# Patient Record
Sex: FEMALE | Race: WHITE | NOT HISPANIC OR LATINO | Employment: UNEMPLOYED | URBAN - METROPOLITAN AREA
[De-identification: names, ages, dates, MRNs, and addresses within clinical notes are randomized per-mention and may not be internally consistent; named-entity substitution may affect disease eponyms.]

---

## 2022-03-05 ENCOUNTER — HOSPITAL ENCOUNTER (EMERGENCY)
Facility: HOSPITAL | Age: 32
Discharge: HOME | End: 2022-03-05
Attending: EMERGENCY MEDICINE

## 2022-03-05 VITALS
HEART RATE: 103 BPM | BODY MASS INDEX: 21.26 KG/M2 | DIASTOLIC BLOOD PRESSURE: 80 MMHG | OXYGEN SATURATION: 98 % | HEIGHT: 63 IN | SYSTOLIC BLOOD PRESSURE: 132 MMHG | WEIGHT: 120 LBS | RESPIRATION RATE: 18 BRPM

## 2022-03-05 DIAGNOSIS — R10.13 EPIGASTRIC ABDOMINAL PAIN: Primary | ICD-10-CM

## 2022-03-05 PROCEDURE — 99284 EMERGENCY DEPT VISIT MOD MDM: CPT | Mod: ,,, | Performed by: EMERGENCY MEDICINE

## 2022-03-05 PROCEDURE — 99284 PR EMERGENCY DEPT VISIT,LEVEL IV: ICD-10-PCS | Mod: ,,, | Performed by: EMERGENCY MEDICINE

## 2022-03-05 PROCEDURE — 25000003 PHARM REV CODE 250: Performed by: EMERGENCY MEDICINE

## 2022-03-05 PROCEDURE — 99284 EMERGENCY DEPT VISIT MOD MDM: CPT

## 2022-03-05 RX ORDER — BUPROPION HYDROCHLORIDE 200 MG/1
100 TABLET, EXTENDED RELEASE ORAL 2 TIMES DAILY
COMMUNITY

## 2022-03-05 RX ORDER — SUCRALFATE 1 G/1
1 TABLET ORAL 4 TIMES DAILY
Qty: 40 TABLET | Refills: 0 | Status: SHIPPED | OUTPATIENT
Start: 2022-03-05 | End: 2022-03-15

## 2022-03-05 RX ORDER — ALUMINUM HYDROXIDE, MAGNESIUM HYDROXIDE, AND SIMETHICONE 2400; 240; 2400 MG/30ML; MG/30ML; MG/30ML
30 SUSPENSION ORAL ONCE
Status: DISCONTINUED | OUTPATIENT
Start: 2022-03-05 | End: 2022-03-05

## 2022-03-05 RX ORDER — HYDROCHLOROTHIAZIDE 25 MG/1
25 TABLET ORAL DAILY
COMMUNITY

## 2022-03-05 RX ORDER — MAG HYDROX/ALUMINUM HYD/SIMETH 200-200-20
30 SUSPENSION, ORAL (FINAL DOSE FORM) ORAL
Status: COMPLETED | OUTPATIENT
Start: 2022-03-05 | End: 2022-03-05

## 2022-03-05 RX ORDER — ALUMINUM HYDROXIDE, MAGNESIUM HYDROXIDE, AND SIMETHICONE 2400; 240; 2400 MG/30ML; MG/30ML; MG/30ML
30 SUSPENSION ORAL EVERY 6 HOURS PRN
Status: DISCONTINUED | OUTPATIENT
Start: 2022-03-05 | End: 2022-03-05

## 2022-03-05 RX ORDER — OMEPRAZOLE 20 MG/1
20 CAPSULE, DELAYED RELEASE ORAL DAILY
Qty: 30 CAPSULE | Refills: 0 | Status: SHIPPED | OUTPATIENT
Start: 2022-03-05

## 2022-03-05 RX ORDER — LIDOCAINE HYDROCHLORIDE 20 MG/ML
10 SOLUTION OROPHARYNGEAL
Status: COMPLETED | OUTPATIENT
Start: 2022-03-05 | End: 2022-03-05

## 2022-03-05 RX ADMIN — LIDOCAINE HYDROCHLORIDE 10 ML: 20 SOLUTION ORAL; TOPICAL at 02:03

## 2022-03-05 RX ADMIN — ALUMINUM HYDROXIDE, MAGNESIUM HYDROXIDE, AND SIMETHICONE 30 ML: 200; 200; 20 SUSPENSION ORAL at 02:03

## 2022-03-05 NOTE — ED TRIAGE NOTES
Wesley Romo, a 32 y.o. female presents to the ED w/ complaint of upper abdominal pain since yesterday. Pt reports sharp, intermittent pain to abdomen. Denies n/v/d/. Denies chest pain/SOB.     Triage note:  Chief Complaint   Patient presents with    Abdominal Pain     Epigastric  pain x 3 days     Review of patient's allergies indicates:  Not on File  No past medical history on file.    Patient identifiers verified and correct for Wesley Romo    LOC: The patient is awake, alert, and aware of environment. The patient is AOX4 and speaking appropriately.   APPEARANCE: No acute distress noted.   HEENT: WDL, PERRLA  PSYCHOSOCIAL: Patient is calm and cooperative. Denies SI/HI.  SKIN: The skin is warm, dry, color consistent with ethnicity. No breakdown or brusing visible.  RESPIRATORY: Airway is open and patent. Bilateral chest rise and fall. Respiratory rate even and unlabored.  No accessory muscle use noted.  CARDIAC: Patient has a normal rate and rhythm. No complaints of chest pain.  ABDOMEN/GI: Soft, non tender. No distention noted. Denies n/v/d. Reports sharp, intermittent abdominal pain since yesterday.   URINARY:  Voids independently without difficulty. No complaints of frequency, urgency, burning, or blood in urine.   NEUROLOGIC: Eyes open spontaneously. Speech clear.  Able to follow commands, demonstrating ability to actively and appropriately communicate within context of current conversation. Symmetrical facial muscles. Movement is purposeful. Denies dizziness/lightheadedness.  MUSCULOSKELETAL: No obvious deformities noted. Full ROM in all extremities.  PERIPHERAL VASCULAR: Cap refill <3 secs bilaterally. No peripheral edema noted. Denies numbness and tingling in extremities.

## 2022-10-20 ENCOUNTER — HOSPITAL ENCOUNTER (EMERGENCY)
Facility: HOSPITAL | Age: 32
Discharge: HOME | End: 2022-10-21
Attending: EMERGENCY MEDICINE

## 2022-10-20 DIAGNOSIS — R05.9 COUGH: ICD-10-CM

## 2022-10-20 DIAGNOSIS — J10.1 INFLUENZA A: ICD-10-CM

## 2022-10-20 DIAGNOSIS — J45.901 EXACERBATION OF ASTHMA, UNSPECIFIED ASTHMA SEVERITY, UNSPECIFIED WHETHER PERSISTENT: Primary | ICD-10-CM

## 2022-10-20 LAB
ALBUMIN SERPL BCP-MCNC: 3.7 G/DL (ref 3.5–5.2)
ALP SERPL-CCNC: 71 U/L (ref 55–135)
ALT SERPL W/O P-5'-P-CCNC: 15 U/L (ref 10–44)
ANION GAP SERPL CALC-SCNC: 10 MMOL/L (ref 8–16)
AST SERPL-CCNC: 14 U/L (ref 10–40)
BASOPHILS # BLD AUTO: 0.03 K/UL (ref 0–0.2)
BASOPHILS NFR BLD: 0.4 % (ref 0–1.9)
BILIRUB SERPL-MCNC: 0.6 MG/DL (ref 0.1–1)
BUN SERPL-MCNC: 4 MG/DL (ref 6–20)
CALCIUM SERPL-MCNC: 9 MG/DL (ref 8.7–10.5)
CHLORIDE SERPL-SCNC: 102 MMOL/L (ref 95–110)
CO2 SERPL-SCNC: 20 MMOL/L (ref 23–29)
CREAT SERPL-MCNC: 0.6 MG/DL (ref 0.5–1.4)
DIFFERENTIAL METHOD: ABNORMAL
EOSINOPHIL # BLD AUTO: 0 K/UL (ref 0–0.5)
EOSINOPHIL NFR BLD: 0.4 % (ref 0–8)
ERYTHROCYTE [DISTWIDTH] IN BLOOD BY AUTOMATED COUNT: 13.2 % (ref 11.5–14.5)
EST. GFR  (NO RACE VARIABLE): >60 ML/MIN/1.73 M^2
GLUCOSE SERPL-MCNC: 92 MG/DL (ref 70–110)
HCT VFR BLD AUTO: 38.7 % (ref 37–48.5)
HCV AB SERPL QL IA: NORMAL
HGB BLD-MCNC: 12.8 G/DL (ref 12–16)
HIV 1+2 AB+HIV1 P24 AG SERPL QL IA: NORMAL
IMM GRANULOCYTES # BLD AUTO: 0.06 K/UL (ref 0–0.04)
IMM GRANULOCYTES NFR BLD AUTO: 0.8 % (ref 0–0.5)
INFLUENZA A, MOLECULAR: POSITIVE
INFLUENZA B, MOLECULAR: NEGATIVE
LYMPHOCYTES # BLD AUTO: 0.5 K/UL (ref 1–4.8)
LYMPHOCYTES NFR BLD: 5.7 % (ref 18–48)
MCH RBC QN AUTO: 29.6 PG (ref 27–31)
MCHC RBC AUTO-ENTMCNC: 33.1 G/DL (ref 32–36)
MCV RBC AUTO: 90 FL (ref 82–98)
MONOCYTES # BLD AUTO: 0.5 K/UL (ref 0.3–1)
MONOCYTES NFR BLD: 6.2 % (ref 4–15)
NEUTROPHILS # BLD AUTO: 6.8 K/UL (ref 1.8–7.7)
NEUTROPHILS NFR BLD: 86.5 % (ref 38–73)
NRBC BLD-RTO: 0 /100 WBC
PLATELET # BLD AUTO: 354 K/UL (ref 150–450)
PMV BLD AUTO: 9.6 FL (ref 9.2–12.9)
POTASSIUM SERPL-SCNC: 3.2 MMOL/L (ref 3.5–5.1)
PROT SERPL-MCNC: 7.1 G/DL (ref 6–8.4)
RBC # BLD AUTO: 4.32 M/UL (ref 4–5.4)
SARS-COV-2 RDRP RESP QL NAA+PROBE: NEGATIVE
SODIUM SERPL-SCNC: 132 MMOL/L (ref 136–145)
SPECIMEN SOURCE: ABNORMAL
WBC # BLD AUTO: 7.88 K/UL (ref 3.9–12.7)

## 2022-10-20 PROCEDURE — 25000242 PHARM REV CODE 250 ALT 637 W/ HCPCS: Performed by: PHYSICIAN ASSISTANT

## 2022-10-20 PROCEDURE — 63600175 PHARM REV CODE 636 W HCPCS: Performed by: PHYSICIAN ASSISTANT

## 2022-10-20 PROCEDURE — 99284 EMERGENCY DEPT VISIT MOD MDM: CPT | Mod: CS,,, | Performed by: EMERGENCY MEDICINE

## 2022-10-20 PROCEDURE — 94640 AIRWAY INHALATION TREATMENT: CPT | Mod: XB

## 2022-10-20 PROCEDURE — 25000003 PHARM REV CODE 250: Performed by: PHYSICIAN ASSISTANT

## 2022-10-20 PROCEDURE — 85025 COMPLETE CBC W/AUTO DIFF WBC: CPT | Performed by: PHYSICIAN ASSISTANT

## 2022-10-20 PROCEDURE — 87502 INFLUENZA DNA AMP PROBE: CPT | Performed by: PHYSICIAN ASSISTANT

## 2022-10-20 PROCEDURE — 87389 HIV-1 AG W/HIV-1&-2 AB AG IA: CPT | Performed by: PHYSICIAN ASSISTANT

## 2022-10-20 PROCEDURE — 93005 ELECTROCARDIOGRAM TRACING: CPT

## 2022-10-20 PROCEDURE — 94761 N-INVAS EAR/PLS OXIMETRY MLT: CPT

## 2022-10-20 PROCEDURE — 99284 PR EMERGENCY DEPT VISIT,LEVEL IV: ICD-10-PCS | Mod: CS,,, | Performed by: EMERGENCY MEDICINE

## 2022-10-20 PROCEDURE — 96374 THER/PROPH/DIAG INJ IV PUSH: CPT

## 2022-10-20 PROCEDURE — 93010 ELECTROCARDIOGRAM REPORT: CPT | Mod: ,,, | Performed by: INTERNAL MEDICINE

## 2022-10-20 PROCEDURE — 86803 HEPATITIS C AB TEST: CPT | Performed by: PHYSICIAN ASSISTANT

## 2022-10-20 PROCEDURE — 96361 HYDRATE IV INFUSION ADD-ON: CPT | Mod: 59

## 2022-10-20 PROCEDURE — 80053 COMPREHEN METABOLIC PANEL: CPT | Performed by: PHYSICIAN ASSISTANT

## 2022-10-20 PROCEDURE — U0002 COVID-19 LAB TEST NON-CDC: HCPCS | Performed by: PHYSICIAN ASSISTANT

## 2022-10-20 PROCEDURE — 99285 EMERGENCY DEPT VISIT HI MDM: CPT | Mod: 25

## 2022-10-20 PROCEDURE — 93010 EKG 12-LEAD: ICD-10-PCS | Mod: ,,, | Performed by: INTERNAL MEDICINE

## 2022-10-20 RX ORDER — OSELTAMIVIR PHOSPHATE 75 MG/1
75 CAPSULE ORAL
Status: COMPLETED | OUTPATIENT
Start: 2022-10-20 | End: 2022-10-20

## 2022-10-20 RX ORDER — IPRATROPIUM BROMIDE AND ALBUTEROL SULFATE 2.5; .5 MG/3ML; MG/3ML
3 SOLUTION RESPIRATORY (INHALATION)
Status: COMPLETED | OUTPATIENT
Start: 2022-10-20 | End: 2022-10-20

## 2022-10-20 RX ORDER — DEXAMETHASONE SODIUM PHOSPHATE 4 MG/ML
10 INJECTION, SOLUTION INTRA-ARTICULAR; INTRALESIONAL; INTRAMUSCULAR; INTRAVENOUS; SOFT TISSUE
Status: COMPLETED | OUTPATIENT
Start: 2022-10-20 | End: 2022-10-20

## 2022-10-20 RX ORDER — ACETAMINOPHEN 500 MG
1000 TABLET ORAL
Status: COMPLETED | OUTPATIENT
Start: 2022-10-20 | End: 2022-10-20

## 2022-10-20 RX ORDER — BENZONATATE 100 MG/1
100 CAPSULE ORAL
Status: COMPLETED | OUTPATIENT
Start: 2022-10-20 | End: 2022-10-20

## 2022-10-20 RX ADMIN — BENZONATATE 100 MG: 100 CAPSULE ORAL at 08:10

## 2022-10-20 RX ADMIN — ACETAMINOPHEN 1000 MG: 500 TABLET ORAL at 08:10

## 2022-10-20 RX ADMIN — DEXAMETHASONE SODIUM PHOSPHATE 10 MG: 4 INJECTION INTRA-ARTICULAR; INTRALESIONAL; INTRAMUSCULAR; INTRAVENOUS; SOFT TISSUE at 09:10

## 2022-10-20 RX ADMIN — IPRATROPIUM BROMIDE AND ALBUTEROL SULFATE 3 ML: 2.5; .5 SOLUTION RESPIRATORY (INHALATION) at 08:10

## 2022-10-20 RX ADMIN — OSELTAMIVIR 75 MG: 75 CAPSULE ORAL at 11:10

## 2022-10-20 RX ADMIN — SODIUM CHLORIDE 500 ML: 0.9 INJECTION, SOLUTION INTRAVENOUS at 08:10

## 2022-10-20 NOTE — Clinical Note
"Wesley Romo (Toni) was seen and treated in our emergency department on 10/20/2022.  She may return to work on 10/25/2022.       If you have any questions or concerns, please don't hesitate to call.      Dionte Villasenor PA-C"

## 2022-10-21 VITALS
BODY MASS INDEX: 40.74 KG/M2 | TEMPERATURE: 99 F | SYSTOLIC BLOOD PRESSURE: 124 MMHG | RESPIRATION RATE: 18 BRPM | DIASTOLIC BLOOD PRESSURE: 68 MMHG | OXYGEN SATURATION: 97 % | WEIGHT: 230 LBS | HEART RATE: 95 BPM

## 2022-10-21 RX ORDER — ALBUTEROL SULFATE 0.63 MG/3ML
0.63 SOLUTION RESPIRATORY (INHALATION) EVERY 6 HOURS PRN
Qty: 75 ML | Refills: 0 | Status: SHIPPED | OUTPATIENT
Start: 2022-10-21 | End: 2023-10-21

## 2022-10-21 RX ORDER — OSELTAMIVIR PHOSPHATE 75 MG/1
75 CAPSULE ORAL 2 TIMES DAILY
Qty: 9 CAPSULE | Refills: 0 | Status: SHIPPED | OUTPATIENT
Start: 2022-10-21 | End: 2022-10-26

## 2022-10-21 RX ORDER — PREDNISONE 20 MG/1
20 TABLET ORAL DAILY
Qty: 2 TABLET | Refills: 0 | OUTPATIENT
Start: 2022-10-21 | End: 2022-10-23

## 2022-10-21 NOTE — DISCHARGE INSTRUCTIONS
Take the prescribed Tamiflu as directed. You may take Tylenol and Ibuprofen as needed for your fever. You received a refill for your albuterol nebulizer solution today.    Follow-up with your primary care provider for further evaluation.    Return to the emergency room for new, worsening, or concerning symptoms.

## 2022-10-21 NOTE — ED NOTES
Pt presented with c/o fever, sore throat, body aches, congestion and cough x2 days. Pt's  recently had influenza. Robitussin taken around noon but pt reports vomiting shortly after due to coughing.

## 2022-10-21 NOTE — ED PROVIDER NOTES
Encounter Date: 10/20/2022       History     Chief Complaint   Patient presents with    Fever     States that she woke up 2 days ago with headache. Now has body aches, fever, n/v, diarrhea. Reports completing 2 neb treatments at home - hx of asthma.      The history is provided by the patient and medical records. No  was used.     Wesley Romo is a 32 y.o. female with medical history of asthma, hypertension, tobacco use presenting to the ED with the chief complaint of fever.    Reports waking up with a headache, fever, chills, myalgias, SOB, dry cough. Reports her  had influenza last week. Denies having her seasonal vaccination. Reports using her albuterol inhaler and nebulizer x2 today with continued SOB which prompted her ED visit today. Denies history of cardiac disease. Reports history of tubal ligation. Denies taking anti-pyretics today.     Review of patient's allergies indicates:   Allergen Reactions    Bactrim [sulfamethoxazole-trimethoprim] Hives and Nausea And Vomiting     Past Medical History:   Diagnosis Date    Asthma     Hypertension      History reviewed. No pertinent surgical history.  History reviewed. No pertinent family history.  Social History     Tobacco Use    Smoking status: Every Day    Smokeless tobacco: Never   Substance Use Topics    Alcohol use: Yes     Comment: socially     Review of Systems   Constitutional:  Positive for chills and fever.   HENT:  Negative for sore throat.    Eyes:  Negative for pain.   Respiratory:  Positive for cough and shortness of breath.    Cardiovascular:  Negative for chest pain.   Gastrointestinal:  Negative for nausea.   Genitourinary:  Negative for dysuria.   Musculoskeletal:  Positive for myalgias. Negative for back pain.   Skin:  Negative for rash.   Neurological:  Positive for headaches. Negative for weakness.   Hematological:  Does not bruise/bleed easily.     Physical Exam     Initial Vitals [10/20/22 1904]   BP Pulse Resp  Temp SpO2   (!) 164/102 (!) 120 20 (!) 101 °F (38.3 °C) 96 %      MAP       --         Physical Exam    Constitutional: She appears well-developed and well-nourished. She is not diaphoretic. She appears ill. No distress.   Obese female   HENT:   Head: Normocephalic and atraumatic.   Mouth/Throat: Oropharynx is clear and moist. No oropharyngeal exudate.   Eyes: Conjunctivae and EOM are normal. Pupils are equal, round, and reactive to light. No scleral icterus.   Neck: Neck supple.   Normal range of motion.  Cardiovascular:  Regular rhythm.   Tachycardia present.         Pulmonary/Chest: Tachypnea noted. No respiratory distress. She has wheezes.   Abdominal: Abdomen is soft. She exhibits no distension. There is no abdominal tenderness. There is no rebound.   Musculoskeletal:         General: No tenderness or edema. Normal range of motion.      Cervical back: Normal range of motion and neck supple.     Neurological: She is alert and oriented to person, place, and time. She has normal strength. No sensory deficit.   Skin: Skin is warm and dry. No rash noted. No erythema.   Psychiatric: She has a normal mood and affect.       ED Course   Procedures  Labs Reviewed   INFLUENZA A & B BY MOLECULAR - Abnormal; Notable for the following components:       Result Value    Influenza A, Molecular Positive (*)     All other components within normal limits   CBC W/ AUTO DIFFERENTIAL - Abnormal; Notable for the following components:    Immature Granulocytes 0.8 (*)     Immature Grans (Abs) 0.06 (*)     Lymph # 0.5 (*)     Gran % 86.5 (*)     Lymph % 5.7 (*)     All other components within normal limits   COMPREHENSIVE METABOLIC PANEL - Abnormal; Notable for the following components:    Sodium 132 (*)     Potassium 3.2 (*)     CO2 20 (*)     BUN 4 (*)     All other components within normal limits   HIV 1 / 2 ANTIBODY    Narrative:     Release to patient->Immediate   HEPATITIS C ANTIBODY    Narrative:     Release to patient->Immediate    SARS-COV-2 RNA AMPLIFICATION, QUAL          Imaging Results              X-Ray Chest AP Portable (Final result)  Result time 10/20/22 21:56:22      Final result by Smooth Bang MD (10/20/22 21:56:22)                   Impression:      No acute findings in the chest.      Electronically signed by: Smooth Bang MD  Date:    10/20/2022  Time:    21:56               Narrative:    EXAMINATION:  XR CHEST AP PORTABLE    CLINICAL HISTORY:  Cough, unspecified    TECHNIQUE:  Single frontal view of the chest was performed.    COMPARISON:  None    FINDINGS:  No consolidation, pleural effusion or pneumothorax.    Cardiomediastinal silhouette is unremarkable.                                       Medications   acetaminophen tablet 1,000 mg (1,000 mg Oral Given 10/20/22 2023)   sodium chloride 0.9% bolus 500 mL (0 mLs Intravenous Stopped 10/20/22 2107)   benzonatate capsule 100 mg (100 mg Oral Given 10/20/22 2023)   albuterol-ipratropium 2.5 mg-0.5 mg/3 mL nebulizer solution 3 mL (3 mLs Nebulization Given 10/20/22 2058)   dexAMETHasone injection 10 mg (10 mg Intravenous Given 10/20/22 2109)   oseltamivir capsule 75 mg (75 mg Oral Given 10/20/22 2325)     Medical Decision Making:   History:   Old Medical Records: I decided to obtain old medical records.  Old Records Summarized: records from clinic visits.  Independently Interpreted Test(s):   I have ordered and independently interpreted EKG Reading(s) - see summary below       <> Summary of EKG Reading(s): Sinus tachycardia 113 bpm. ST depression in inferior and lateral leads. No STEMI  Clinical Tests:   Lab Tests: Ordered and Reviewed  Radiological Study: Ordered and Reviewed  Medical Tests: Ordered and Reviewed     APC / Resident Notes:    32 y.o. female with medical history of asthma, hypertension, tobacco use presenting to the ED c/o 2 days of headache, fever, chills, myalgias, SOB, dry cough.  had influenza last week. DDx includes but not limited to  influenza, COVID, asthma exacerbation, pneumonia, acute respiratory failure, cardiac arrhythmia.      Attending Attestation:     Physician Attestation Statement for NP/PA:   I discussed this assessment and plan of this patient with the NP/PA, but I did not personally examine the patient. The face to face encounter was performed by the NP/PA.    Other NP/PA Attestation Additions:      Medical Decision Making: Slight upsloping ST segment depressions in inferior and lateral leads seems most likely rate and demand related.  Tachycardia greatly improved after treatment which is reassuring against myocarditis.  Most likely symptoms due to influenza and asthma exacerbation.                  Influenza A positive and likely causing patient to have an asthma exacerbation. CXR without large consolidation, pleural effusion, or pneumothorax on my read. Patient with significant improvement after duo-nebs and decadron in the ED. Monitored in the ED without reoccurrence of her symptoms. Will start Tamiflu and provide 2 more days of prednisone. Additionally provided refill for duo-neb solution. Patient expresses understanding and agreeable to the plan. Return to ED precautions given for new, worsening, or concerning symptoms. I have discussed the care of this patient with my supervising physician.            Clinical Impression:   Final diagnoses:  [R05.9] Cough  [J45.901] Exacerbation of asthma, unspecified asthma severity, unspecified whether persistent (Primary)  [J10.1] Influenza A      ED Disposition Condition    Discharge Stable          ED Prescriptions       Medication Sig Dispense Start Date End Date Auth. Provider    oseltamivir (TAMIFLU) 75 MG capsule Take 1 capsule (75 mg total) by mouth 2 (two) times daily. for 5 days 9 capsule 10/21/2022 10/26/2022 Dionte Villasenor PA-C    predniSONE (DELTASONE) 20 MG tablet Take 1 tablet (20 mg total) by mouth once daily. for 2 days 2 tablet 10/21/2022 10/23/2022 Dionte Villasenor PA-C     albuterol (ACCUNEB) 0.63 mg/3 mL Nebu Take 3 mLs (0.63 mg total) by nebulization every 6 (six) hours as needed. Rescue 75 mL 10/21/2022 10/21/2023 Dionte Villasenor PA-C          Follow-up Information       Follow up With Specialties Details Why Contact Info Additional Information    [facility]   [address]  [facility] [id number] [phone] [facility] Please park in surface lot and check in at central registration desk             Dionte Villasenor PA-C  10/21/22 0056       Alejandro Lutz MD  10/21/22 3835

## 2022-10-23 ENCOUNTER — HOSPITAL ENCOUNTER (EMERGENCY)
Facility: HOSPITAL | Age: 32
Discharge: HOME | End: 2022-10-23
Attending: EMERGENCY MEDICINE

## 2022-10-23 ENCOUNTER — NURSE TRIAGE (OUTPATIENT)
Dept: ADMINISTRATIVE | Facility: CLINIC | Age: 32
End: 2022-10-23

## 2022-10-23 VITALS
RESPIRATION RATE: 18 BRPM | HEART RATE: 78 BPM | BODY MASS INDEX: 40.75 KG/M2 | HEIGHT: 63 IN | TEMPERATURE: 98 F | SYSTOLIC BLOOD PRESSURE: 138 MMHG | OXYGEN SATURATION: 98 % | WEIGHT: 230 LBS | DIASTOLIC BLOOD PRESSURE: 80 MMHG

## 2022-10-23 DIAGNOSIS — J45.901 EXACERBATION OF ASTHMA, UNSPECIFIED ASTHMA SEVERITY, UNSPECIFIED WHETHER PERSISTENT: Primary | ICD-10-CM

## 2022-10-23 DIAGNOSIS — J10.1 INFLUENZA A: ICD-10-CM

## 2022-10-23 DIAGNOSIS — R06.02 SOB (SHORTNESS OF BREATH): ICD-10-CM

## 2022-10-23 DIAGNOSIS — R05.9 COUGH: ICD-10-CM

## 2022-10-23 LAB
BUN SERPL-MCNC: 6 MG/DL (ref 6–30)
CHLORIDE SERPL-SCNC: 107 MMOL/L (ref 95–110)
CREAT SERPL-MCNC: 0.4 MG/DL (ref 0.5–1.4)
GLUCOSE SERPL-MCNC: 88 MG/DL (ref 70–110)
HCT VFR BLD CALC: 38 %PCV (ref 36–54)
POC IONIZED CALCIUM: 1.17 MMOL/L (ref 1.06–1.42)
POC TCO2 (MEASURED): 22 MMOL/L (ref 23–29)
POTASSIUM BLD-SCNC: 3.6 MMOL/L (ref 3.5–5.1)
SAMPLE: ABNORMAL
SODIUM BLD-SCNC: 139 MMOL/L (ref 136–145)

## 2022-10-23 PROCEDURE — 93005 ELECTROCARDIOGRAM TRACING: CPT

## 2022-10-23 PROCEDURE — 25000242 PHARM REV CODE 250 ALT 637 W/ HCPCS: Performed by: NURSE PRACTITIONER

## 2022-10-23 PROCEDURE — 93010 ELECTROCARDIOGRAM REPORT: CPT | Mod: ,,, | Performed by: INTERNAL MEDICINE

## 2022-10-23 PROCEDURE — 94640 AIRWAY INHALATION TREATMENT: CPT | Mod: XB

## 2022-10-23 PROCEDURE — 99284 PR EMERGENCY DEPT VISIT,LEVEL IV: ICD-10-PCS | Mod: ,,, | Performed by: EMERGENCY MEDICINE

## 2022-10-23 PROCEDURE — 93010 EKG 12-LEAD: ICD-10-PCS | Mod: ,,, | Performed by: INTERNAL MEDICINE

## 2022-10-23 PROCEDURE — 99284 EMERGENCY DEPT VISIT MOD MDM: CPT | Mod: ,,, | Performed by: EMERGENCY MEDICINE

## 2022-10-23 PROCEDURE — 99285 EMERGENCY DEPT VISIT HI MDM: CPT | Mod: 25

## 2022-10-23 PROCEDURE — 94761 N-INVAS EAR/PLS OXIMETRY MLT: CPT

## 2022-10-23 PROCEDURE — 63600175 PHARM REV CODE 636 W HCPCS: Performed by: NURSE PRACTITIONER

## 2022-10-23 RX ORDER — BENZONATATE 200 MG/1
200 CAPSULE ORAL 3 TIMES DAILY PRN
Qty: 30 CAPSULE | Refills: 0 | Status: SHIPPED | OUTPATIENT
Start: 2022-10-23 | End: 2022-11-02

## 2022-10-23 RX ORDER — IPRATROPIUM BROMIDE AND ALBUTEROL SULFATE 2.5; .5 MG/3ML; MG/3ML
3 SOLUTION RESPIRATORY (INHALATION)
Status: COMPLETED | OUTPATIENT
Start: 2022-10-23 | End: 2022-10-23

## 2022-10-23 RX ORDER — PREDNISONE 20 MG/1
40 TABLET ORAL DAILY
Qty: 8 TABLET | Refills: 0 | Status: SHIPPED | OUTPATIENT
Start: 2022-10-24 | End: 2022-10-28

## 2022-10-23 RX ORDER — PREDNISONE 20 MG/1
40 TABLET ORAL
Status: COMPLETED | OUTPATIENT
Start: 2022-10-23 | End: 2022-10-23

## 2022-10-23 RX ADMIN — IPRATROPIUM BROMIDE AND ALBUTEROL SULFATE 3 ML: 2.5; .5 SOLUTION RESPIRATORY (INHALATION) at 02:10

## 2022-10-23 RX ADMIN — IPRATROPIUM BROMIDE AND ALBUTEROL SULFATE 3 ML: 2.5; .5 SOLUTION RESPIRATORY (INHALATION) at 01:10

## 2022-10-23 RX ADMIN — PREDNISONE 40 MG: 20 TABLET ORAL at 01:10

## 2022-10-23 NOTE — ED NOTES
C/C: Worsening cough and SOB after recent flu dx    APPEARANCE: awake and alert in NAD.  SKIN: warm, dry and intact. No breakdown or bruising.  MUSCULOSKELETAL: Patient moving all extremities spontaneously, no obvious swelling or deformities noted. Ambulates independently.  RESPIRATORY:  Respirations unlabored.   CARDIAC: Denies CP, 2+ distal pulses; no peripheral edema  ABDOMEN: S/ND/NT, Denies nausea  : voids spontaneously, denies difficulty  Neurologic: AAO x 4; follows commands equal strength in all extremities; denies numbness/tingling. Denies dizziness

## 2022-10-23 NOTE — ED NOTES
I-STAT Chem-8+ Results:   Value Reference Range   Sodium 139 136-145 mmol/L   Potassium  3.6 3.5-5.1 mmol/L   Chloride 107  mmol/L   Ionized Calcium 1.17 1.06-1.42 mmol/L   CO2 (measured) 22 23-29 mmol/L   Glucose 88  mg/dL   BUN 6 6-30 mg/dL   Creatinine 0.4 0.5-1.4 mg/dL   Hematocrit 38 36-54%

## 2022-10-23 NOTE — TELEPHONE ENCOUNTER
Pt dx with the flu on this past Thursday and states the cough is worsening.  Pt with complaints of chest apin even when not coughing.  Care advice states to go to ED now.  All questions answered, advised to call back for worsening symptoms or 911 for symptoms discussed.     Reason for Disposition   Chest pain  (Exception: MILD central chest pain, present only when coughing)    Additional Information   Negative: SEVERE difficulty breathing (e.g., struggling for each breath, speaks in single words)   Negative: Bluish (or gray) lips or face now   Negative: Shock suspected (e.g., cold/pale/clammy skin, too weak to stand, low BP, rapid pulse)   Negative: Sounds like a life-threatening emergency to the triager    Protocols used: Influenza Follow-up Call-A-

## 2022-10-23 NOTE — ED PROVIDER NOTES
Chief complaint:  Cough (Worsening cough and SOB after being dx with the flu.)      HPI:  Wesley Romo is a 32 y.o. female with past medical history significant for asthma and hypertension who presents to the emergency department today for evaluation of a cough.  She was seen here on 10/20/2022 for flu symptoms and had a confirmed to influenza swab during that visit.  She was treated with Tamiflu of which she reports having 2 doses left.  She was discharged home with a prescription for prednisone 20 mg once daily for 2 days which she completed.  She states that initially she felt better but once the prednisone prescription ran out she began having a worsened cough.  She states that she was given a dose of Tessalon in the emergency department which helped her cough but did not receive a prescription to go home with.  She states that she is overall feeling improved compared to her ED visit on 10/20/2022.  She denies fever or chills.  Denies myalgias or malaise.  She is using her albuterol inhaler with some improvement.  The cough is nonproductive.  She denies chest pain.  She reports shortness of breath that only occurs during a coughing spell.  Denies leg pain or swelling.  No other problems or concerns voiced at this time.      Review of patient's allergies indicates:   Allergen Reactions    Bactrim [sulfamethoxazole-trimethoprim] Hives and Nausea And Vomiting       No current facility-administered medications on file prior to encounter.     Current Outpatient Medications on File Prior to Encounter   Medication Sig Dispense Refill    albuterol (ACCUNEB) 0.63 mg/3 mL Nebu Take 3 mLs (0.63 mg total) by nebulization every 6 (six) hours as needed. Rescue 75 mL 0    buPROPion (WELLBUTRIN SR) 200 MG SR12 Take 100 mg by mouth 2 (two) times daily.      hydroCHLOROthiazide (HYDRODIURIL) 25 MG tablet Take 25 mg by mouth once daily.      omeprazole (PRILOSEC) 20 MG capsule Take 1 capsule (20 mg total) by mouth once daily. 30  capsule 0    oseltamivir (TAMIFLU) 75 MG capsule Take 1 capsule (75 mg total) by mouth 2 (two) times daily. for 5 days 9 capsule 0       PMH:  As per HPI and below:  Past Medical History:   Diagnosis Date    Asthma     Hypertension      No past surgical history on file.    Social History     Socioeconomic History    Marital status:    Tobacco Use    Smoking status: Every Day    Smokeless tobacco: Never   Substance and Sexual Activity    Alcohol use: Yes     Comment: socially       No family history on file.    Review of Systems  As per HPI and Below  Constitutional: Negative for chills and fever.   HENT: Negative for congestion and sinus pressure.    Eyes: Negative for visual disturbance.   Respiratory:  Positive for cough.  Negative for sputum.  Positive for shortness of breath.  Cardiovascular: Negative for chest pain.   Gastrointestinal: Negative for abdominal pain, diarrhea, nausea and vomiting.   Genitourinary: Negative for flank pain. Negative for dysuria.  Musculoskeletal: Negative for arthralgias and myalgias.   Skin: Negative for rash and wound.   Neurological: Negative for dizziness. Negative for weakness and numbness.  Negative for headache.  For  Psychiatric/Behavioral: Negative for confusion.       Physical Exam:    Vitals:    10/23/22 1510   BP: 138/80   Pulse: 78   Resp: 18   Temp:      Nursing note and vitals reviewed.  Constitutional: Patient appears well-developed and well-nourished. Not diaphoretic. No distress.   HENT:   Head: Normocephalic and atraumatic.  Normal posterior oropharynx.  Eyes: Conjunctivae are normal. No scleral icterus.   Neck: Normal range of motion.   Pulmonary/Chest: No respiratory distress. Speaking in complete sentences. No accessory muscle use.  Nonproductive cough on exam.  Musculoskeletal: Normal range of motion.   Neurological: Alert and oriented to person, place, and time.  Skin: Skin is warm and dry.   Psychiatric: Normal mood and affect. Thought content normal.         Labs Reviewed   ISTAT PROCEDURE - Abnormal; Notable for the following components:       Result Value    POC Creatinine 0.4 (*)     POC TCO2 (MEASURED) 22 (*)     All other components within normal limits       Imaging Results              X-Ray Chest PA And Lateral (Final result)  Result time 10/23/22 13:59:48      Final result by Tracy Harley MD (10/23/22 13:59:48)                   Impression:      Normal chest radiograph.      Electronically signed by: Tracy Harley MD  Date:    10/23/2022  Time:    13:59               Narrative:    EXAMINATION:  XR CHEST PA AND LATERAL    CLINICAL HISTORY:  Cough, unspecified    TECHNIQUE:  PA and lateral views of the chest were performed.    COMPARISON:  Prior dated 10/20/2022    FINDINGS:  The mediastinal structures are midline.  The cardiac silhouette is not enlarged.  There is no evidence of acute pulmonary disease, pleural disease, lymph node enlargement, or cardiac decompensation.  No osseous abnormalities are identified.                                      X-Ray Chest AP Portable    Result Date: 10/20/2022  EXAMINATION: XR CHEST AP PORTABLE CLINICAL HISTORY: Cough, unspecified TECHNIQUE: Single frontal view of the chest was performed. COMPARISON: None FINDINGS: No consolidation, pleural effusion or pneumothorax. Cardiomediastinal silhouette is unremarkable.     No acute findings in the chest. Electronically signed by: Smooth Bang MD Date:    10/20/2022 Time:    21:56      Procedures      MDM:    32 y.o. female seen here on 10/20/2022 for flu symptoms and had a confirmed to influenza swab during that visit. Hx of asthma. She was treated with Tamiflu which she is still taking. She completed the 2 day course of prednisone and then started having a worsened cough.  No fever or  chills at home. She is afebrile and non toxic appearing in the ED. No respiratory distress.    Will obtain chest x-ray. Will also repeat istat as she was slightly hypokalemic at  3.2 at her prior ED visit.    CXR negative for acute findings. No pneumonia. Labs unremarkable. Potassium normal today.     I do not suspect pneumonia rather acute asthma exacerbation brought on by recent viral infection.    Given duoneb tx in ED. she was given a dose of oral prednisone 40 mg in the ED.    Pt stable for discharge home with outpatient follow-up.  She was discharged home with a prescription for prednisone and Tessalon Perles.  Finish Tamiflu as prescribed. Strict ED return precautions discussed.        Attending Attestation:             Attending ED Notes:   EKG on my independent interpretation:  NSR, rate 90, no STEMI, no ectopy    I was immediately available for consultation, but was not involved in the management of the patient.           Medication List        START taking these medications      benzonatate 200 MG capsule  Commonly known as: TESSALON  Take 1 capsule (200 mg total) by mouth 3 (three) times daily as needed for Cough.            CHANGE how you take these medications      predniSONE 20 MG tablet  Commonly known as: DELTASONE  Take 2 tablets (40 mg total) by mouth once daily. for 4 days  What changed: how much to take            ASK your doctor about these medications      albuterol 0.63 mg/3 mL Nebu  Commonly known as: ACCUNEB  Take 3 mLs (0.63 mg total) by nebulization every 6 (six) hours as needed. Rescue     buPROPion 200 MG Sr12  Commonly known as: WELLBUTRIN SR     hydroCHLOROthiazide 25 MG tablet  Commonly known as: HYDRODIURIL     omeprazole 20 MG capsule  Commonly known as: PRILOSEC  Take 1 capsule (20 mg total) by mouth once daily.     oseltamivir 75 MG capsule  Commonly known as: TAMIFLU  Take 1 capsule (75 mg total) by mouth 2 (two) times daily. for 5 days               Where to Get Your Medications        You can get these medications from any pharmacy    Bring a paper prescription for each of these medications  benzonatate 200 MG capsule  predniSONE 20 MG tablet           Diagnoses:  The primary encounter diagnosis was Exacerbation of asthma, unspecified asthma severity, unspecified whether persistent. Diagnoses of SOB (shortness of breath), Cough, and Influenza A were also pertinent to this visit.             Julia Huizar NP  10/24/22 2123       Antony Mattson MD  10/25/22 2963

## 2023-06-07 NOTE — ED PROVIDER NOTES
Encounter Date: 3/5/2022       History     Chief Complaint   Patient presents with    Abdominal Pain     Epigastric  pain x 3 days     HPI     33 yo female presents for abdominal pain. Located at epigastrium. Present since yesterday in contrast to triage note. Pain is stabbing, worse immediately after eating / drinking anything. Has had similar in past, diagnosed with stomach ulcer and took prilosec with improvement. Pt reports h/o cholecystectomy, bilateral tubal ligation, no other abdominal surgeries.  She denies fever, chills, chest pain, shortness of breath, cough, nausea, vomiting, change in urine or her bowel movements.  Last bowel movement yesterday, nonbloody.  Still passing gas.  Tried Aleve hand Gas-X without improvement.  Last menstrual period 3 weeks ago, normal.  Patient smokes, drinks, worse over last few days just prior to her marriage and with [employer].         Review of patient's allergies indicates:   Allergen Reactions    Bactrim [sulfamethoxazole-trimethoprim] Hives and Nausea And Vomiting     Past Medical History:   Diagnosis Date    Asthma     Hypertension      History reviewed. No pertinent surgical history.  History reviewed. No pertinent family history.  Social History     Tobacco Use    Smoking status: Current Every Day Smoker    Smokeless tobacco: Never Used   Substance Use Topics    Alcohol use: Yes     Comment: socially     Review of Systems     General: No fever.  No chills.  Eyes: No visual changes.  Head: No headache.    Integument: No rashes or lesions.  Chest: No shortness of breath.  Cardiovascular: No chest pain.  Abdomen: +abdominal pain.  No nausea or vomiting.  Urinary: No abnormal urination.  Neurologic: No focal weakness.  No numbness.  Hematologic: No easy bruising.  Endocrine: No excessive thirst or urination.      Physical Exam     Initial Vitals [03/05/22 1335]   BP Pulse Resp Temp SpO2   132/80 103 18 -- 98 %      MAP       --         Physical  Exam    Appearance: No acute distress.  HEENT: Normocephalic. Atraumatic. No conjunctival injection. EOMI. PERRL.    Neck: No JVD. Neck supple.    Chest: Non-tender. No respiratory distress  Cardiovascular: Borderline tachycardic at triage, resolved on my assessment. Regular rhythm. +2 radial pulses bilaterally.  Abdomen: Not distended. Neg [place], Neg [place], Neg [place] / no rebound. Mild epigastric ttp.   Musculoskeletal: Good range of motion all joints. No deformities.    Neurologic: Alert and oriented x 3.  Equal strength in upper and lower extremities bilaterally. Normal sensation. No facial droop. Normal speech.    Psych:  Appropriate, conversant   Integumentary: No rashes seen.  Good turgor.  No abrasions.  No ecchymoses.        ED Course   Procedures  Labs Reviewed   HIV 1 / 2 ANTIBODY   HEPATITIS C ANTIBODY          Imaging Results    None          Medications   LIDOcaine HCl 2% oral solution 10 mL (10 mLs Oral Given 3/5/22 1424)   aluminum-magnesium hydroxide-simethicone 200-200-20 mg/5 mL suspension 30 mL (30 mLs Oral Given 3/5/22 1424)                          Clinical Impression:   Final diagnoses:  [R10.13] Epigastric abdominal pain (Primary)       31 yo female presents for epigastric abdominal pain x 3d. VSS, afeb. Abdomen: Not distended. Neg [place], Neg [place], Neg [place] / no rebound. Mild epigastric ttp. Pt well-appearing, NAD, nontoxic. Gave GI cocktail with improvement.     Discussed results, diagnosis, and treatment plan with pt; advised close follow-up with PCP. Reviewed strict return precautions. Pt confirms understanding and ability to comply.       ED Disposition Condition    Discharge Stable        ED Prescriptions     Medication Sig Dispense Start Date End Date Auth. Provider    omeprazole (PRILOSEC) 20 MG capsule Take 1 capsule (20 mg total) by mouth once daily. 30 capsule 3/5/2022  Dahiana Constantino MD    sucralfate (CARAFATE) 1 gram tablet Take 1 tablet (1 g total) by mouth  4 (four) times daily. for 10 days 40 tablet 3/5/2022 3/15/2022 Dahiana Constantino MD        Follow-up Information     Follow up With Specialties Details Why Contact Info Additional Information    [facility] Internal Medicine Internal Medicine Schedule an appointment as soon as possible for a visit   [address] [facility] [id number]  [phone] Internal Medicine - [facility], 8th Floor, [address] Please park in [place] and use [place] elevators           Dahiana Constantino MD  03/05/22 9694     6 (moderate pain)